# Patient Record
Sex: MALE | Race: WHITE | NOT HISPANIC OR LATINO | Employment: PART TIME | URBAN - NONMETROPOLITAN AREA
[De-identification: names, ages, dates, MRNs, and addresses within clinical notes are randomized per-mention and may not be internally consistent; named-entity substitution may affect disease eponyms.]

---
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
## 2021-12-11 ENCOUNTER — HOSPITAL ENCOUNTER (EMERGENCY)
Facility: MEDICAL CENTER | Age: 21
End: 2021-12-11
Attending: EMERGENCY MEDICINE
Payer: MEDICAID

## 2021-12-11 VITALS
BODY MASS INDEX: 19.68 KG/M2 | TEMPERATURE: 97.3 F | OXYGEN SATURATION: 98 % | HEIGHT: 72 IN | WEIGHT: 145.28 LBS | HEART RATE: 75 BPM | DIASTOLIC BLOOD PRESSURE: 85 MMHG | RESPIRATION RATE: 16 BRPM | SYSTOLIC BLOOD PRESSURE: 115 MMHG

## 2021-12-11 DIAGNOSIS — J34.89 SINUS PAIN: ICD-10-CM

## 2021-12-11 DIAGNOSIS — K04.7 DENTAL INFECTION: ICD-10-CM

## 2021-12-11 PROCEDURE — 99282 EMERGENCY DEPT VISIT SF MDM: CPT

## 2021-12-11 RX ORDER — AMOXICILLIN 500 MG/1
500 CAPSULE ORAL 3 TIMES DAILY
Qty: 21 CAPSULE | Refills: 0 | Status: SHIPPED | OUTPATIENT
Start: 2021-12-11 | End: 2021-12-18

## 2021-12-11 ASSESSMENT — LIFESTYLE VARIABLES: DO YOU DRINK ALCOHOL: NO

## 2021-12-12 NOTE — DISCHARGE INSTRUCTIONS
Take the medications as directed.  Follow-up with the dentist as soon as you can.  Any facial swelling worsening pain or concerns return.

## 2021-12-12 NOTE — ED NOTES
Pt discharged to home. Pt was given follow up instructions and prescriptions for amoxicillin. Pt verbalized understanding of all instructions for discharge and is ambulatory out of ED with steady gait. AOx4

## 2021-12-12 NOTE — ED TRIAGE NOTES
"Chief Complaint   Patient presents with   • Mouth Pain     Pt has \"bad teeth\" and worried about a possible infection on the upper left side.    • Sinus Pain     Pt started having left sided sinus pain x4 days. Pt worried about a sinus infection. Pt states his \"he has bright yellow snot\".      /82   Pulse 71   Temp 36.3 °C (97.3 °F) (Oral)   Resp 18   Ht 1.829 m (6')   Wt 65.9 kg (145 lb 4.5 oz)   SpO2 99% Comment: RA  BMI 19.70 kg/m²     Patient arrived to ED for the above complaint. Triage process explained to patient. Patient placed in lobby and told to notify staff of any changes.   "

## 2021-12-12 NOTE — ED PROVIDER NOTES
"ED Provider Note    Scribed for Shelley Christopher M.D. by Skylar Carter. 12/11/2021, 6:47 PM.    Primary care provider: Ruddy Wong M.D.  Means of arrival: Walk in   History obtained from: Patient  History limited by: None    CHIEF COMPLAINT  Chief Complaint   Patient presents with   • Mouth Pain     Pt has \"bad teeth\" and worried about a possible infection on the upper left side.    • Sinus Pain     Pt started having left sided sinus pain x4 days. Pt worried about a sinus infection. Pt states his \"he has bright yellow snot\".        HPI  Judah Maynard is a 21 y.o. male who presents to the Emergency Department for sinus and tooth pain that began a few days ago. The patient notes that he is worried about a possible infection in the upper left side of his mouth. In addition, he is having left sided sinus pain with bright yellow rhinorrhea. He notes of associated tactile warmth a few days ago, but it has subsided since. The patient denies ear pain, cough, facial swelling or shortness of breath. The patient has no medical problems.       REVIEW OF SYSTEMS  Pertinent positives include sinus pain, tooth pain, rhinorrhea, and tactile warmth. Pertinent negatives include no ear pain, facial swelling, cough, or shortness of breath. All other systems reviewed and negative.     PAST MEDICAL HISTORY   has a past medical history of Allergy, History of drug abuse, and SVT (supraventricular tachycardia) (HCC).    SURGICAL HISTORY   has a past surgical history that includes tonsillectomy and adenoidectomy (Bilateral, 7/31/2015).    SOCIAL HISTORY  Social History     Tobacco Use   • Smoking status: Current Every Day Smoker     Packs/day: 0.25     Years: 2.00     Pack years: 0.50     Types: Cigarettes   • Smokeless tobacco: Never Used   • Tobacco comment: 3-4 cigarettes a day   Substance Use Topics   • Alcohol use: No     Alcohol/week: 0.0 oz   • Drug use: Yes     Types: Marijuana      Social History     Substance and Sexual " Activity   Drug Use Yes   • Types: Marijuana       FAMILY HISTORY  Family History   Problem Relation Age of Onset   • Thyroid Mother         Graves [facility]   • Anxiety disorder Father    • Hypertension Father    • Cancer Maternal Grandfather         lung   • Heart Disease Paternal Grandfather    • Thyroid Maternal Uncle         thyroid disease   • Diabetes Neg Hx    • Hyperlipidemia Neg Hx    • Stroke Neg Hx        CURRENT MEDICATIONS  Home Medications     Reviewed by Ashley Holcomb R.N. (Registered Nurse) on 12/11/21 at 1808  Med List Status: Not Addressed   Medication Last Dose Status        Patient Derrek Taking any Medications                       ALLERGIES  Allergies   Allergen Reactions   • Food Shortness of Breath     Almonds, , carrot, avocado.   • Avocado Itching and Swelling   • Other Food      Pluots, itching        PHYSICAL EXAM  VITAL SIGNS: /82   Pulse 71   Temp 36.3 °C (97.3 °F) (Oral)   Resp 18   Ht 1.829 m (6')   Wt 65.9 kg (145 lb 4.5 oz)   SpO2 99% Comment: RA  BMI 19.70 kg/m²     Constitutional: Well developed, Well nourished, No acute distress, Non-toxic appearance.   HENT:  Atraumatic, Normocephalic. Bilateral external ears normal, Oropharynx moist, no facial swelling, dental caries, no swelling under tongue, no trismus or drooling, normal voice.  TMs clear bilaterally.  Some minimal tenderness to the left maxillary sinus to palpation no facial swelling.  Eyes: Grossly Normal inspection  Neck: Normal range of motion, no meningeal signs, no swelling to neck  Cardiovascular: Normal heart rate  Thorax & Lungs: No respiratory distress  Skin: No rash.   Neuro:  No numbness or weakness to face        COURSE & MEDICAL DECISION MAKING  Nursing notes, VS, PMSFHx reviewed in chart.     6:47 PM Patient seen and examined at bedside.  Patient is complaining of left sinus and tooth pain.  TMs clear.  No difficulty breathing.  No swelling under the tongue.  No obvious abscess to drain.   Difficult to determine if this is a tooth issue versus a sinus issue.  Plan is to place patient on amoxicillin.  Return precautions were given.  Follow-up with a dentist.      FINAL IMPRESSION  1. Sinus pain    2. Dental infection          Skylar MANZANO (Samanthaibe), am scribing for, and in the presence of, Shelley Christopher M.D..    Electronically signed by: Skylar Carter (Samanthaibe), 12/11/2021    Shelley MANZANO M.D. personally performed the services described in this documentation, as scribed by Skylar Carter in my presence, and it is both accurate and complete.    The note accurately reflects work and decisions made by me.  Shelley Christopher M.D.  12/11/2021  7:03 PM

## 2022-01-11 ENCOUNTER — APPOINTMENT (OUTPATIENT)
Dept: URGENT CARE | Facility: CLINIC | Age: 22
End: 2022-01-11
Payer: MEDICAID